# Patient Record
Sex: FEMALE | Race: WHITE | NOT HISPANIC OR LATINO | Employment: PART TIME | URBAN - METROPOLITAN AREA
[De-identification: names, ages, dates, MRNs, and addresses within clinical notes are randomized per-mention and may not be internally consistent; named-entity substitution may affect disease eponyms.]

---

## 2019-01-04 ENCOUNTER — HOSPITAL ENCOUNTER (OUTPATIENT)
Facility: OTHER | Age: 59
Discharge: HOME | End: 2019-01-05
Attending: EMERGENCY MEDICINE | Admitting: EMERGENCY MEDICINE
Payer: COMMERCIAL

## 2019-01-04 DIAGNOSIS — R60.0 LOWER EXTREMITY EDEMA: ICD-10-CM

## 2019-01-04 DIAGNOSIS — L03.116 LEFT LEG CELLULITIS: ICD-10-CM

## 2019-01-04 DIAGNOSIS — L03.116 CELLULITIS OF LEFT LOWER EXTREMITY WITHOUT FOOT: Primary | ICD-10-CM

## 2019-01-04 DIAGNOSIS — R60.0 LEG EDEMA, LEFT: ICD-10-CM

## 2019-01-04 DIAGNOSIS — E03.9 ACQUIRED HYPOTHYROIDISM: ICD-10-CM

## 2019-01-04 DIAGNOSIS — I10 ESSENTIAL HYPERTENSION: ICD-10-CM

## 2019-01-04 DIAGNOSIS — E66.01 MORBID OBESITY WITH BMI OF 45.0-49.9, ADULT: ICD-10-CM

## 2019-01-04 DIAGNOSIS — F41.8 ANXIETY WITH DEPRESSION: ICD-10-CM

## 2019-01-04 PROBLEM — F32.A DEPRESSION: Status: RESOLVED | Noted: 2019-01-04 | Resolved: 2019-01-04

## 2019-01-04 PROBLEM — F32.A DEPRESSION: Status: ACTIVE | Noted: 2019-01-04

## 2019-01-04 LAB
ALBUMIN SERPL BCP-MCNC: 3.4 G/DL
ALP SERPL-CCNC: 86 U/L
ALT SERPL W/O P-5'-P-CCNC: 15 U/L
ANION GAP SERPL CALC-SCNC: 7 MMOL/L
AST SERPL-CCNC: 12 U/L
BASOPHILS # BLD AUTO: 0.03 K/UL
BASOPHILS NFR BLD: 0.3 %
BILIRUB SERPL-MCNC: 0.3 MG/DL
BUN SERPL-MCNC: 18 MG/DL
CALCIUM SERPL-MCNC: 9.3 MG/DL
CHLORIDE SERPL-SCNC: 109 MMOL/L
CO2 SERPL-SCNC: 23 MMOL/L
CREAT SERPL-MCNC: 0.8 MG/DL
DIFFERENTIAL METHOD: ABNORMAL
EOSINOPHIL # BLD AUTO: 0.2 K/UL
EOSINOPHIL NFR BLD: 1.7 %
ERYTHROCYTE [DISTWIDTH] IN BLOOD BY AUTOMATED COUNT: 14.5 %
EST. GFR  (AFRICAN AMERICAN): >60 ML/MIN/1.73 M^2
EST. GFR  (NON AFRICAN AMERICAN): >60 ML/MIN/1.73 M^2
GLUCOSE SERPL-MCNC: 112 MG/DL
HCT VFR BLD AUTO: 38.8 %
HGB BLD-MCNC: 12.4 G/DL
LYMPHOCYTES # BLD AUTO: 1.3 K/UL
LYMPHOCYTES NFR BLD: 13.3 %
MCH RBC QN AUTO: 28.6 PG
MCHC RBC AUTO-ENTMCNC: 32 G/DL
MCV RBC AUTO: 89 FL
MONOCYTES # BLD AUTO: 0.8 K/UL
MONOCYTES NFR BLD: 8.2 %
NEUTROPHILS # BLD AUTO: 7.5 K/UL
NEUTROPHILS NFR BLD: 76.3 %
PLATELET # BLD AUTO: 305 K/UL
PMV BLD AUTO: 9.2 FL
POTASSIUM SERPL-SCNC: 4.4 MMOL/L
PROT SERPL-MCNC: 6.8 G/DL
RBC # BLD AUTO: 4.34 M/UL
SODIUM SERPL-SCNC: 139 MMOL/L
WBC # BLD AUTO: 9.81 K/UL

## 2019-01-04 PROCEDURE — 85025 COMPLETE CBC W/AUTO DIFF WBC: CPT

## 2019-01-04 PROCEDURE — 63600175 PHARM REV CODE 636 W HCPCS: Performed by: NURSE PRACTITIONER

## 2019-01-04 PROCEDURE — 96366 THER/PROPH/DIAG IV INF ADDON: CPT

## 2019-01-04 PROCEDURE — 87040 BLOOD CULTURE FOR BACTERIA: CPT

## 2019-01-04 PROCEDURE — 25000003 PHARM REV CODE 250: Performed by: NURSE PRACTITIONER

## 2019-01-04 PROCEDURE — 99285 EMERGENCY DEPT VISIT HI MDM: CPT | Mod: 25

## 2019-01-04 PROCEDURE — 63600175 PHARM REV CODE 636 W HCPCS: Performed by: EMERGENCY MEDICINE

## 2019-01-04 PROCEDURE — 99220 PR INITIAL OBSERVATION CARE,LEVL III: ICD-10-PCS | Mod: ,,, | Performed by: NURSE PRACTITIONER

## 2019-01-04 PROCEDURE — 96365 THER/PROPH/DIAG IV INF INIT: CPT

## 2019-01-04 PROCEDURE — G0378 HOSPITAL OBSERVATION PER HR: HCPCS

## 2019-01-04 PROCEDURE — 99220 PR INITIAL OBSERVATION CARE,LEVL III: CPT | Mod: ,,, | Performed by: NURSE PRACTITIONER

## 2019-01-04 PROCEDURE — 80053 COMPREHEN METABOLIC PANEL: CPT

## 2019-01-04 PROCEDURE — 94761 N-INVAS EAR/PLS OXIMETRY MLT: CPT

## 2019-01-04 PROCEDURE — 25000003 PHARM REV CODE 250: Performed by: EMERGENCY MEDICINE

## 2019-01-04 RX ORDER — CEFAZOLIN SODIUM 1 G/50ML
1 SOLUTION INTRAVENOUS
Status: DISCONTINUED | OUTPATIENT
Start: 2019-01-04 | End: 2019-01-05 | Stop reason: HOSPADM

## 2019-01-04 RX ORDER — ALLOPURINOL 300 MG/1
300 TABLET ORAL DAILY
Refills: 3 | COMMUNITY
Start: 2018-10-10

## 2019-01-04 RX ORDER — LORAZEPAM 0.5 MG/1
0.5 TABLET ORAL EVERY 6 HOURS PRN
Status: DISCONTINUED | OUTPATIENT
Start: 2019-01-04 | End: 2019-01-05 | Stop reason: HOSPADM

## 2019-01-04 RX ORDER — LORAZEPAM 0.5 MG/1
TABLET ORAL
Refills: 1 | COMMUNITY
Start: 2018-10-18

## 2019-01-04 RX ORDER — LEVOTHYROXINE SODIUM 100 UG/1
100 TABLET ORAL DAILY
COMMUNITY
Start: 2011-02-01

## 2019-01-04 RX ORDER — SERTRALINE HYDROCHLORIDE 25 MG/1
25 TABLET, FILM COATED ORAL DAILY
COMMUNITY

## 2019-01-04 RX ORDER — ACETAMINOPHEN 325 MG/1
650 TABLET ORAL EVERY 4 HOURS PRN
Status: DISCONTINUED | OUTPATIENT
Start: 2019-01-04 | End: 2019-01-05 | Stop reason: HOSPADM

## 2019-01-04 RX ORDER — LISINOPRIL 20 MG/1
20 TABLET ORAL DAILY
Status: DISCONTINUED | OUTPATIENT
Start: 2019-01-04 | End: 2019-01-05 | Stop reason: HOSPADM

## 2019-01-04 RX ORDER — LEVOTHYROXINE SODIUM 100 UG/1
100 TABLET ORAL
Status: DISCONTINUED | OUTPATIENT
Start: 2019-01-05 | End: 2019-01-05 | Stop reason: HOSPADM

## 2019-01-04 RX ORDER — CLINDAMYCIN HYDROCHLORIDE 150 MG/1
450 CAPSULE ORAL EVERY 8 HOURS
Qty: 63 CAPSULE | Refills: 0 | Status: SHIPPED | OUTPATIENT
Start: 2019-01-04 | End: 2019-01-05 | Stop reason: HOSPADM

## 2019-01-04 RX ORDER — SODIUM CHLORIDE 0.9 % (FLUSH) 0.9 %
5 SYRINGE (ML) INJECTION
Status: DISCONTINUED | OUTPATIENT
Start: 2019-01-04 | End: 2019-01-05 | Stop reason: HOSPADM

## 2019-01-04 RX ORDER — ONDANSETRON 8 MG/1
8 TABLET, ORALLY DISINTEGRATING ORAL EVERY 8 HOURS PRN
Qty: 21 TABLET | Refills: 0 | Status: SHIPPED | OUTPATIENT
Start: 2019-01-04 | End: 2019-01-05 | Stop reason: HOSPADM

## 2019-01-04 RX ORDER — LISINOPRIL 20 MG/1
20 TABLET ORAL DAILY
Refills: 3 | COMMUNITY
Start: 2018-10-10

## 2019-01-04 RX ORDER — ONDANSETRON 8 MG/1
8 TABLET, ORALLY DISINTEGRATING ORAL EVERY 8 HOURS PRN
Status: DISCONTINUED | OUTPATIENT
Start: 2019-01-04 | End: 2019-01-05 | Stop reason: HOSPADM

## 2019-01-04 RX ORDER — ONDANSETRON 8 MG/1
8 TABLET, ORALLY DISINTEGRATING ORAL
Status: COMPLETED | OUTPATIENT
Start: 2019-01-04 | End: 2019-01-04

## 2019-01-04 RX ORDER — CLINDAMYCIN HYDROCHLORIDE 150 MG/1
450 CAPSULE ORAL
Status: COMPLETED | OUTPATIENT
Start: 2019-01-04 | End: 2019-01-04

## 2019-01-04 RX ORDER — ALLOPURINOL 300 MG/1
300 TABLET ORAL DAILY
Status: DISCONTINUED | OUTPATIENT
Start: 2019-01-04 | End: 2019-01-05 | Stop reason: HOSPADM

## 2019-01-04 RX ORDER — SERTRALINE HYDROCHLORIDE 25 MG/1
25 TABLET, FILM COATED ORAL DAILY
Status: DISCONTINUED | OUTPATIENT
Start: 2019-01-04 | End: 2019-01-05 | Stop reason: HOSPADM

## 2019-01-04 RX ADMIN — CEFAZOLIN SODIUM 1 G: 1 SOLUTION INTRAVENOUS at 04:01

## 2019-01-04 RX ADMIN — DOXYCYCLINE 100 MG: 100 INJECTION, POWDER, LYOPHILIZED, FOR SOLUTION INTRAVENOUS at 02:01

## 2019-01-04 RX ADMIN — CLINDAMYCIN HYDROCHLORIDE 450 MG: 150 CAPSULE ORAL at 06:01

## 2019-01-04 RX ADMIN — PROMETHAZINE HYDROCHLORIDE 12.5 MG: 25 INJECTION INTRAMUSCULAR; INTRAVENOUS at 11:01

## 2019-01-04 RX ADMIN — PROMETHAZINE HYDROCHLORIDE 12.5 MG: 25 INJECTION INTRAMUSCULAR; INTRAVENOUS at 01:01

## 2019-01-04 RX ADMIN — ONDANSETRON 8 MG: 8 TABLET, ORALLY DISINTEGRATING ORAL at 07:01

## 2019-01-04 RX ADMIN — PROMETHAZINE HYDROCHLORIDE 12.5 MG: 25 INJECTION INTRAMUSCULAR; INTRAVENOUS at 07:01

## 2019-01-04 RX ADMIN — ONDANSETRON 8 MG: 8 TABLET, ORALLY DISINTEGRATING ORAL at 06:01

## 2019-01-04 NOTE — ASSESSMENT & PLAN NOTE
- doppler ultrasound left lower extremity negative for acute deep vein thrombosis   - patient afebrile and without leukocytosis; blood cultures pending  - start doxycycline 100 mg IVPB twice daily now  - offer phenergan intravenously every six hours as needed for  Nausea after medication administration

## 2019-01-04 NOTE — H&P
"[facility] Medicine  History & Physical    Patient Name: Yamileth Deleon  MRN: [id number]  Admission Date: 1/4/2019  Attending Physician: Karen Sagastume MD   Primary Care Provider: Provider [employer]         Patient information was obtained from patient, past medical records and ER records.     Subjective:     Principal Problem:Left leg cellulitis    Chief Complaint:   Chief Complaint   Patient presents with    Cellulitis     to the L lower leg, painful to touch and hot. states she was on a plane for 12 hrs yesterday        HPI: Jennifer Deleon is a 58 year old female with a medical history of hypertension, thyroid disease, and anxiety presented to the [facility] Department with complaint of left lower leg swelling and pain that was first noted yesterday evening and reports began several hours after eating  "gumbo and raw oysters."  The patient states she is visiting town from [place] and arrived last night after travelling two hours in a car and two plane rides.   She reported initial symptoms of redness and swelling to left ankle.  Overnight she reports increased swelling and the appearance of non-pruritic  "red rash" to left ankle and shin.    She denies fever/chills, chest pain, shortness of breath, fever, chills or malaise.  Doppler ultrasound completed in the ED was negative for acute deep vein thrombosis and labs were unremarkable and she was afebrile.  She received one dose of oral clindamycin 450 mg in the ED. She was admitted under Observation for further evaluation of left lower extremity cellulitis and administration of intravenous antibiotics.           Past Medical History:   Diagnosis Date    Depression     Gout     Hypertension     Thyroid disease        Past Surgical History:   Procedure Laterality Date    BACK SURGERY      BREAST SURGERY      HERNIA REPAIR         Review of patient's allergies indicates:   Allergen Reactions    " Doxycycline Nausea And Vomiting     Reports intolerance to oral antibiotics    Indomethacin Nausea And Vomiting       No current facility-administered medications on file prior to encounter.      Current Outpatient Medications on File Prior to Encounter   Medication Sig    allopurinol (ZYLOPRIM) 300 MG tablet Take 300 mg by mouth once daily.    levothyroxine (LEVOXYL) 100 MCG tablet Take by mouth.    lisinopril (PRINIVIL,ZESTRIL) 20 MG tablet Take 20 mg by mouth once daily.    LORazepam (ATIVAN) 0.5 MG tablet TAKE 1 TABLET BY MOUTH EVERY 6 HOURS TO EVERY 8 HOURS AS NEEDED    sertraline (ZOLOFT) 50 MG tablet Take 75 mg by mouth.     Family History     None        Tobacco Use    Smoking status: Never Smoker    Smokeless tobacco: Never Used   Substance and Sexual Activity    Alcohol use: Yes    Drug use: No    Sexual activity: Not on file     Review of Systems   Constitutional: Negative for chills and fever.   HENT: Negative for congestion and trouble swallowing.    Eyes: Negative for visual disturbance.   Respiratory: Negative for chest tightness and shortness of breath.    Cardiovascular: Negative for chest pain and palpitations.   Gastrointestinal: Negative for abdominal pain, diarrhea, nausea and vomiting.   Genitourinary: Negative for dysuria, frequency and hematuria.   Musculoskeletal: Positive for gait problem (left ankle pain).   Skin: Positive for color change (left shin and ankle) and rash (left shin and ankle).   Neurological: Negative for seizures, weakness and headaches.   Hematological: Does not bruise/bleed easily.   Psychiatric/Behavioral: Negative.      Objective:     Vital Signs (Most Recent):  Temp: 98.6 °F (37 °C) (01/04/19 1116)  Pulse: 94 (01/04/19 1116)  Resp: 17 (01/04/19 1116)  BP: 139/79 (01/04/19 1116)  SpO2: 99 % (01/04/19 1116) Vital Signs (24h Range):  Temp:  [97.5 °F (36.4 °C)-98.6 °F (37 °C)] 98.6 °F (37 °C)  Pulse:  [66-94] 94  Resp:  [16-20] 17  SpO2:  [94 %-99 %] 99 %  BP:  (120-176)/(60-86) 139/79     Weight: 134.4 kg (296 lb 4.8 oz)  Body mass index is 46.41 kg/m².    Physical Exam   Constitutional: She is oriented to person, place, and time. She appears well-developed and well-nourished. No distress.   HENT:   Head: Normocephalic and atraumatic.   Mouth/Throat: Oropharynx is clear and moist.   Eyes: Conjunctivae, EOM and lids are normal. Pupils are equal, round, and reactive to light.   Neck: Normal range of motion. Neck supple.   Cardiovascular: Normal rate, regular rhythm, normal heart sounds and intact distal pulses.   Pulmonary/Chest: Effort normal and breath sounds normal.   Abdominal: Soft. Normal appearance and bowel sounds are normal. There is no tenderness.   Musculoskeletal: She exhibits edema (left shin and ankle, localized) and tenderness.   Lymphadenopathy:     She has no cervical adenopathy.   Neurological: She is alert and oriented to person, place, and time. She has normal strength.   Skin: Skin is warm, dry and intact. Rash noted. Rash is macular. Rash is not pustular, not vesicular and not urticarial. There is erythema.        Psychiatric: She has a normal mood and affect. Her behavior is normal. Cognition and memory are normal.   Nursing note and vitals reviewed.        CRANIAL NERVES     CN III, IV, VI   Pupils are equal, round, and reactive to light.  Extraocular motions are normal.        Significant Labs:   Blood Culture: No results for input(s): LABBLOO in the last 48 hours.  CBC:   Recent Labs   Lab 01/04/19  0631   WBC 9.81   HGB 12.4   HCT 38.8        CMP:   Recent Labs   Lab 01/04/19  0631      K 4.4      CO2 23   *   BUN 18   CREATININE 0.8   CALCIUM 9.3   PROT 6.8   ALBUMIN 3.4*   BILITOT 0.3   ALKPHOS 86   AST 12   ALT 15   ANIONGAP 7*   EGFRNONAA >60        All pertinent labs within the past 24 hours have been reviewed.    Significant Imaging: I have reviewed all pertinent imaging results/findings within the past 24  hours.    Assessment/Plan:     * Left leg cellulitis    - doppler ultrasound left lower extremity negative for acute deep vein thrombosis   - patient afebrile and without leukocytosis; blood cultures pending  - start doxycycline 100 mg IVPB twice daily now  - offer phenergan intravenously every six hours as needed for  Nausea after medication administration     Essential hypertension    - stable and continue home dose of lisinopril 20 mg daily       Acquired hypothyroidism    - continue home dose levothyroxine 100 mcg daily       Anxiety with depression    - stable and continue home dose sertraline 25 mg daily       Morbid obesity with BMI of 45.0-49.9, adult    -  on dietary choices and increase physical activity         VTE Risk Mitigation (From admission, onward)        Ordered     IP VTE HIGH RISK PATIENT  Once      01/04/19 0901             Maryse Colbert NP  Department of Hospital Medicine   [facility]

## 2019-01-04 NOTE — PLAN OF CARE
Discharge Planning:  Patient admitted on 1-4-19  LOS- day 0     Chart reviewed, Care plan discussed  Discussed care plan with treatment team,  attending Dr Sagastume/Maryse REYNOLDS  Consults following are: case mgt.     PCP updated in Epic: Dorinda Lara in [place]  Pharmacy, updated in [facility]: [facility]    DME at home: n/a    Current dispo: home soon. (From [facility])  Case management  to follow

## 2019-01-04 NOTE — ED PROVIDER NOTES
Encounter Date: 1/4/2019       History     Chief Complaint   Patient presents with    Cellulitis     to the L lower leg, painful to touch and hot. states she was on a plane for 12 hrs yesterday     58-year-old female with history of hypertension and thyroid disease presents complaining of left lower leg pain which began overnight.  Patient reports traveling by car and plane from [place] to [place] and arriving last night.  She reports shortly after having dinner (gumbo and raw oyster) last night she noticed some redness and warmth to her leg which subsequently became painful overnight.  She reports never having gumbo before but has had raw oysters without any complication.  She denies any associated chest pain, shortness of breath, fever, chills or malaise.            Review of patient's allergies indicates:   Allergen Reactions    Doxycycline Nausea And Vomiting    Indomethacin Nausea And Vomiting     Past Medical History:   Diagnosis Date    Depression     Gout     Hypertension     Thyroid disease      Past Surgical History:   Procedure Laterality Date    BACK SURGERY      BREAST SURGERY      HERNIA REPAIR       History reviewed. No pertinent family history.  Social History     Tobacco Use    Smoking status: Never Smoker    Smokeless tobacco: Never Used   Substance Use Topics    Alcohol use: Yes    Drug use: No     Review of Systems   Constitutional: Negative for chills, fatigue and fever.   HENT: Negative for facial swelling and sore throat.    Respiratory: Negative for cough, chest tightness, wheezing and stridor.    Cardiovascular: Negative for chest pain and palpitations.   Gastrointestinal: Negative for abdominal pain, diarrhea, nausea and vomiting.   Musculoskeletal: Negative for arthralgias, back pain and myalgias.   Skin: Positive for color change.   Neurological: Negative for dizziness, light-headedness and headaches.       Physical Exam     Initial Vitals [01/04/19 0508]   BP Pulse Resp  Temp SpO2   (!) 144/86 91 20 98.2 °F (36.8 °C) (!) 94 %      MAP       --         Physical Exam    Constitutional: She appears well-developed and well-nourished. She is not diaphoretic. No distress.   HENT:   Head: Normocephalic and atraumatic.   Right Ear: External ear normal.   Left Ear: External ear normal.   Nose: Nose normal.   Eyes: Conjunctivae and EOM are normal. Right eye exhibits no discharge. Left eye exhibits no discharge. No scleral icterus.   Neck: Normal range of motion. Neck supple. No tracheal deviation present. No JVD present.   Cardiovascular: Normal rate, regular rhythm, normal heart sounds and intact distal pulses.   2+ DP pulse   Pulmonary/Chest: Breath sounds normal. No stridor. No respiratory distress. She has no wheezes. She has no rhonchi. She has no rales.   Musculoskeletal: Normal range of motion.   No calf tenderness, no palpable masses records, compartments and lower leg soft   Neurological: She is alert and oriented to person, place, and time. She has normal strength. No sensory deficit. GCS score is 15. GCS eye subscore is 4. GCS verbal subscore is 5. GCS motor subscore is 6.   Skin: Skin is warm. Capillary refill takes less than 2 seconds. There is erythema.   Non circumferential Erythema and warmth to the anterior left lower leg with no involvement of the foot   Psychiatric: She has a normal mood and affect. Her behavior is normal. Judgment and thought content normal.         ED Course   Procedures  Labs Reviewed   CBC W/ AUTO DIFFERENTIAL - Abnormal; Notable for the following components:       Result Value    Gran% 76.3 (*)     Lymph% 13.3 (*)     All other components within normal limits   CULTURE, BLOOD   CULTURE, BLOOD   COMPREHENSIVE METABOLIC PANEL          Imaging Results    None             Additional MDM:   Comments: 58-year-old female presents with an erythematous, warm, painful left lower leg after recent travel.  Her concern upon arrival was a possible DVT, however, her  presentation is most consistent with a cellulitis.  She currently exam has no systemic symptoms and is afebrile, hemodynamically stable and well-appearing.  I have discussed with her the plan of giving her antibiotics in the emergency department, outlining the area of erythema, and obtaining basic labs as well as discharging her with a prescription for antibiotics.  The patient is amenable to this plan but is requesting to not receive doxycycline since she has not tolerated it well previously.  He is also requesting a prescription for antiemetics as she typically has difficulty tolerating antibiotics.    She will receive a dose of clindamycin in the emergency department along with Zofran and has been stressed to her to seek immediate re-evaluation if she experiences any fever, worsening redness/warmth the on the area outlined this morning, or if she develops any other symptoms.    6:55 AM  Upon further consideration, given rapid progression of area of cellulitis, the patient will be admitted for observation.  She has agreed to receive doxycycline which will be preceded by Phenergan..                    Clinical Impression:     1. Left leg cellulitis                                   Xochitl Gaston MD  01/04/19 0693

## 2019-01-04 NOTE — PHARMACY MED REC
"Admission Medication Reconciliation - Pharmacy Consult Note    The home medication history was taken by Juana Ko CPhT.  Based on information gathered and subsequent review by the clinical pharmacist, the items below may need attention.    You may go to "Admission" then "Reconcile Home Medications" tabs to review and/or act upon these items.    No issues noted with the medication reconciliation.    Medications Prior to Admission   Medication Sig Dispense Refill Last Dose    allopurinol (ZYLOPRIM) 300 MG tablet Take 300 mg by mouth once daily.  3 1/3/2019 at Unknown time    levothyroxine (LEVOXYL) 100 MCG tablet Take 100 mcg by mouth once daily.    1/3/2019 at Unknown time    lisinopril (PRINIVIL,ZESTRIL) 20 MG tablet Take 20 mg by mouth once daily.  3 1/3/2019 at Unknown time    LORazepam (ATIVAN) 0.5 MG tablet TAKE 1 TABLET BY MOUTH EVERY 6 HOURS TO EVERY 8 HOURS AS NEEDED  1 1/3/2019 at Unknown time    sertraline (ZOLOFT) 25 MG tablet Take 25 mg by mouth once daily.    1/3/2019 at Unknown time       Please address this information as you see fit.  Feel free to contact us if you have any questions or require assistance.    Laurent Mckeon, Pharm.D., BCPS  [phone]                .  .          "

## 2019-01-04 NOTE — SUBJECTIVE & OBJECTIVE
Past Medical History:   Diagnosis Date    Depression     Gout     Hypertension     Thyroid disease        Past Surgical History:   Procedure Laterality Date    BACK SURGERY      BREAST SURGERY      HERNIA REPAIR         Review of patient's allergies indicates:   Allergen Reactions    Doxycycline Nausea And Vomiting     Reports intolerance to oral antibiotics    Indomethacin Nausea And Vomiting       No current facility-administered medications on file prior to encounter.      Current Outpatient Medications on File Prior to Encounter   Medication Sig    allopurinol (ZYLOPRIM) 300 MG tablet Take 300 mg by mouth once daily.    levothyroxine (LEVOXYL) 100 MCG tablet Take by mouth.    lisinopril (PRINIVIL,ZESTRIL) 20 MG tablet Take 20 mg by mouth once daily.    LORazepam (ATIVAN) 0.5 MG tablet TAKE 1 TABLET BY MOUTH EVERY 6 HOURS TO EVERY 8 HOURS AS NEEDED    sertraline (ZOLOFT) 50 MG tablet Take 75 mg by mouth.     Family History     None        Tobacco Use    Smoking status: Never Smoker    Smokeless tobacco: Never Used   Substance and Sexual Activity    Alcohol use: Yes    Drug use: No    Sexual activity: Not on file     Review of Systems   Constitutional: Negative for chills and fever.   HENT: Negative for congestion and trouble swallowing.    Eyes: Negative for visual disturbance.   Respiratory: Negative for chest tightness and shortness of breath.    Cardiovascular: Negative for chest pain and palpitations.   Gastrointestinal: Negative for abdominal pain, diarrhea, nausea and vomiting.   Genitourinary: Negative for dysuria, frequency and hematuria.   Musculoskeletal: Positive for gait problem (left ankle pain).   Skin: Positive for color change (left shin and ankle) and rash (left shin and ankle).   Neurological: Negative for seizures, weakness and headaches.   Hematological: Does not bruise/bleed easily.   Psychiatric/Behavioral: Negative.      Objective:     Vital Signs (Most Recent):  Temp:  98.6 °F (37 °C) (01/04/19 1116)  Pulse: 94 (01/04/19 1116)  Resp: 17 (01/04/19 1116)  BP: 139/79 (01/04/19 1116)  SpO2: 99 % (01/04/19 1116) Vital Signs (24h Range):  Temp:  [97.5 °F (36.4 °C)-98.6 °F (37 °C)] 98.6 °F (37 °C)  Pulse:  [66-94] 94  Resp:  [16-20] 17  SpO2:  [94 %-99 %] 99 %  BP: (120-176)/(60-86) 139/79     Weight: 134.4 kg (296 lb 4.8 oz)  Body mass index is 46.41 kg/m².    Physical Exam   Constitutional: She is oriented to person, place, and time. She appears well-developed and well-nourished. No distress.   HENT:   Head: Normocephalic and atraumatic.   Mouth/Throat: Oropharynx is clear and moist.   Eyes: Conjunctivae, EOM and lids are normal. Pupils are equal, round, and reactive to light.   Neck: Normal range of motion. Neck supple.   Cardiovascular: Normal rate, regular rhythm, normal heart sounds and intact distal pulses.   Pulmonary/Chest: Effort normal and breath sounds normal.   Abdominal: Soft. Normal appearance and bowel sounds are normal. There is no tenderness.   Musculoskeletal: She exhibits edema (left shin and ankle, localized) and tenderness.   Lymphadenopathy:     She has no cervical adenopathy.   Neurological: She is alert and oriented to person, place, and time. She has normal strength.   Skin: Skin is warm, dry and intact. Rash noted. Rash is macular. Rash is not pustular, not vesicular and not urticarial. There is erythema.        Psychiatric: She has a normal mood and affect. Her behavior is normal. Cognition and memory are normal.   Nursing note and vitals reviewed.        CRANIAL NERVES     CN III, IV, VI   Pupils are equal, round, and reactive to light.  Extraocular motions are normal.        Significant Labs:   Blood Culture: No results for input(s): LABBLOO in the last 48 hours.  CBC:   Recent Labs   Lab 01/04/19  0631   WBC 9.81   HGB 12.4   HCT 38.8        CMP:   Recent Labs   Lab 01/04/19  0631      K 4.4      CO2 23   *   BUN 18   CREATININE  0.8   CALCIUM 9.3   PROT 6.8   ALBUMIN 3.4*   BILITOT 0.3   ALKPHOS 86   AST 12   ALT 15   ANIONGAP 7*   EGFRNONAA >60        All pertinent labs within the past 24 hours have been reviewed.    Significant Imaging: I have reviewed all pertinent imaging results/findings within the past 24 hours.

## 2019-01-04 NOTE — HPI
"Jennifer Deleon is a 58 year old female with a medical history of hypertension, thyroid disease, and anxiety presented to the [facility] with complaint of left lower leg swelling and pain that was first noted yesterday evening and reports began several hours after eating  "gumbo and raw oysters."  The patient states she is visiting [facility] from [place] and arrived last night after travelling two hours in a car and two plane rides.   She reported initial symptoms of redness and swelling to left ankle.  Overnight she reports increased swelling and the appearance of non-pruritic  "red rash" to left ankle and shin.    She denies fever/chills, chest pain, shortness of breath, fever, chills or malaise.  Doppler ultrasound completed in the ED was negative for acute deep vein thrombosis and labs were unremarkable and she was afebrile.  She received one dose of oral clindamycin 450 mg in the ED. She was admitted under Observation for further evaluation of left lower extremity cellulitis and administration of intravenous antibiotics.         "

## 2019-01-04 NOTE — PLAN OF CARE
01/04/19 1252   Discharge Assessment   Assessment Type Discharge Planning Assessment   Confirmed/corrected address and phone number on facesheet? Yes  ([facility])   Assessment information obtained from? Patient;Caregiver;Medical Record   Communicated expected length of stay with patient/caregiver yes   Prior to hospitilization cognitive status: Alert/Oriented   Prior to hospitalization functional status: Independent   Current cognitive status: Alert/Oriented   Current Functional Status: Assistive Equipment   Lives With parent(s)   Able to Return to Prior Arrangements yes   Is patient able to care for self after discharge? Yes   Patient currently being followed by outpatient case management? No   Patient currently receives any other outside agency services? No   Equipment Currently Used at Home none   Do you have any problems affording any of your prescribed medications? No   Is the patient taking medications as prescribed? yes   Does the patient have transportation home? Yes   Transportation Anticipated car, drives self   Discharge Plan A Home   Patient/Family in Agreement with Plan yes

## 2019-01-05 VITALS
TEMPERATURE: 98 F | BODY MASS INDEX: 45.99 KG/M2 | SYSTOLIC BLOOD PRESSURE: 132 MMHG | WEIGHT: 293 LBS | HEART RATE: 62 BPM | RESPIRATION RATE: 18 BRPM | DIASTOLIC BLOOD PRESSURE: 63 MMHG | OXYGEN SATURATION: 97 % | HEIGHT: 67 IN

## 2019-01-05 PROCEDURE — 25000003 PHARM REV CODE 250: Performed by: NURSE PRACTITIONER

## 2019-01-05 PROCEDURE — G0378 HOSPITAL OBSERVATION PER HR: HCPCS

## 2019-01-05 PROCEDURE — 63600175 PHARM REV CODE 636 W HCPCS: Performed by: NURSE PRACTITIONER

## 2019-01-05 PROCEDURE — 99217 PR OBSERVATION CARE DISCHARGE: CPT | Mod: ,,, | Performed by: NURSE PRACTITIONER

## 2019-01-05 PROCEDURE — 99217 PR OBSERVATION CARE DISCHARGE: ICD-10-PCS | Mod: ,,, | Performed by: NURSE PRACTITIONER

## 2019-01-05 RX ORDER — SODIUM CHLORIDE 0.9 % (FLUSH) 0.9 %
5 SYRINGE (ML) INJECTION
Status: DISCONTINUED | OUTPATIENT
Start: 2019-01-05 | End: 2019-01-05 | Stop reason: HOSPADM

## 2019-01-05 RX ORDER — PROMETHAZINE HYDROCHLORIDE 12.5 MG/1
12.5 TABLET ORAL EVERY 6 HOURS PRN
Qty: 25 TABLET | Refills: 0 | Status: SHIPPED | OUTPATIENT
Start: 2019-01-05 | End: 2019-01-12

## 2019-01-05 RX ORDER — CEPHALEXIN 500 MG/1
500 CAPSULE ORAL EVERY 6 HOURS
Qty: 28 CAPSULE | Refills: 0 | Status: SHIPPED | OUTPATIENT
Start: 2019-01-05 | End: 2019-01-12

## 2019-01-05 RX ADMIN — LISINOPRIL 20 MG: 20 TABLET ORAL at 08:01

## 2019-01-05 RX ADMIN — CEFAZOLIN SODIUM 1 G: 1 SOLUTION INTRAVENOUS at 01:01

## 2019-01-05 RX ADMIN — PROMETHAZINE HYDROCHLORIDE 12.5 MG: 25 INJECTION INTRAMUSCULAR; INTRAVENOUS at 10:01

## 2019-01-05 RX ADMIN — SERTRALINE HYDROCHLORIDE 25 MG: 25 TABLET ORAL at 08:01

## 2019-01-05 RX ADMIN — LEVOTHYROXINE SODIUM 100 MCG: 100 TABLET ORAL at 06:01

## 2019-01-05 RX ADMIN — ALLOPURINOL 300 MG: 300 TABLET ORAL at 08:01

## 2019-01-05 RX ADMIN — CEFAZOLIN SODIUM 1 G: 1 SOLUTION INTRAVENOUS at 08:01

## 2019-01-05 NOTE — PLAN OF CARE
Discharge planner notified [facility] medical on-call, Sb to obtain authorization to pull cane from [employer]. Per Sb , patient's insurance benefits would need prior verification for auth.  Notified Yesi (CM Director) for cost transfer authorization in the event patient's payor does not cover item. Per Yesi okay to pull from [employer].    1055-Straight cane delivered to patient's bedside; patient provided equipment instructions and signed delivery ticket

## 2019-01-05 NOTE — DISCHARGE INSTRUCTIONS
It is very important he seek immediate re-evaluation if he develops any fever, worsening pain, further redness or swelling beyond the area outlined this morning.  It is also very important that he take the antibiotics as prescribed.

## 2019-01-05 NOTE — PLAN OF CARE
Problem: Adult Inpatient Plan of Care  Goal: Plan of Care Review  Outcome: Ongoing (interventions implemented as appropriate)  Pt in room resting and reading. Pt complaints of pain to left lower leg but refuses medication because pain is only when walking to restroom. Nausea intermittent. Pt informed to keep extremity elevated, site pink/red and warm, tender to touch. VSS. NAD. Pt updated on POC and verbalized understanding. Bed l;ocked and in lowest position. Call bell in reach. Condition stable, will continue to monitor.

## 2019-01-05 NOTE — PLAN OF CARE
Problem: Adult Inpatient Plan of Care  Goal: Plan of Care Review  Outcome: Ongoing (interventions implemented as appropriate)  Pt's V/S were monitored throughout the shift. The pt ambulated to the restroom independently. The pt only c/o pain with ambulation . The pt remained free from falls and trauma. The pt c/o intermittent nausea and received PO and IV medication with relief. The pt's left leg remained elevated. The pt's LLE is warm and red. The pt's bed is in the lowest position with the bed wheels locked. Call light within reach. NAD noted. Will continue to monitor.

## 2019-01-06 NOTE — DISCHARGE SUMMARY
"[facility] Hospital Medicine  Discharge Summary      Patient Name: Yamileth Dleeon  MRN: [id number]  Admission Date: 1/4/2019  Hospital Length of Stay: 0 days  Discharge Date and Time: 1/5/2019  2:55 PM  Attending Physician: Karen Sagastume MD   Discharging Provider: Maryse Colbert NP  Primary Care Provider: Johana Notinsystem      HPI:   Jennifer Deleon is a 58 year old female with a medical history of hypertension, thyroid disease, and anxiety presented to the [facility] with complaint of left lower leg swelling and pain that was first noted yesterday evening and reports began several hours after eating  "gumbo and raw oysters."  The patient states she is visiting town from [place] and arrived last night after travelling two hours in a car and two plane rides.   She reported initial symptoms of redness and swelling to left ankle.  Overnight she reports increased swelling and the appearance of non-pruritic  "red rash" to left ankle and shin.    She denies fever/chills, chest pain, shortness of breath, fever, chills or malaise.  Doppler ultrasound completed in the ED was negative for acute deep vein thrombosis and labs were unremarkable and she was afebrile.  She received one dose of oral clindamycin 450 mg in the ED. She was admitted under Observation for further evaluation of left lower extremity cellulitis and administration of intravenous antibiotics.         Hospital Course:   Yamileth Deleon was admitted under Observation for management of left lower extremity cellulitis.   The patient received one dose of  doxycyline 100 mg IVPB and was transitioned to cefazolin 1 gram every 8 hours for increased MSSA coverage.   Given the patient reported history of nausea and vomiting with antibiotics, she received phenergan without report of symptoms.   The patient reported decrease pain; on physical exam both edema and erythema was significantly resolved.  Blood " cultures collected in the ED were preliminarily negative.  She remained afebrile and without leukocytosis.  She was discharged with oral cephalexin 500 mg every six hours for a total of seven days.  I recommended the patient elevate the left lower extremity while not walking and to return to the ED with fever/chills or worsening swelling.  She plans to return to [place] on Monday and I recommended she follow up with her Primary Care Physician within one week for follow up.          Final Active Diagnoses:    Diagnosis Date Noted POA    PRINCIPAL PROBLEM:  Cellulitis of left lower extremity without foot [L03.116] 01/04/2019 Yes    Essential hypertension [I10] 01/04/2019 Yes    Acquired hypothyroidism [E03.9] 01/04/2019 Yes    Anxiety with depression [F41.8] 01/04/2019 Yes    Morbid obesity with BMI of 45.0-49.9, adult [E66.01, Z68.42] 01/04/2019 Not Applicable      Problems Resolved During this Admission:       Discharged Condition: good    Disposition: Home or Self Care    Follow Up:  Follow-up Information     Go to [facility].    Specialty:  Emergency Medicine  Why:  If symptoms worsen, please reports to [facility] for further evaluation.  Contact information:  [address]  [facility] [address] [phone]           Cortney Lara. Schedule an appointment as soon as possible for a visit in 1 week.    Why:  follow up after hospital discharge for further evaluation   Contact information:  [facility]               Patient Instructions:      Diet Adult Regular     Notify your health care provider if you experience any of the following:  temperature >100.4     Notify your health care provider if you experience any of the following:  severe uncontrolled pain     Notify your health care provider if you experience any of the following:  redness, tenderness, or signs of infection (pain, swelling, redness, odor or  green/yellow discharge around incision site)     Activity as tolerated       Significant Diagnostic Studies: Labs:     BMP  Lab Results   Component Value Date     01/04/2019    K 4.4 01/04/2019     01/04/2019    CO2 23 01/04/2019    BUN 18 01/04/2019    CREATININE 0.8 01/04/2019    CALCIUM 9.3 01/04/2019    ANIONGAP 7 (L) 01/04/2019    ESTGFRAFRICA >60 01/04/2019    EGFRNONAA >60 01/04/2019     Lab Results   Component Value Date    WBC 9.81 01/04/2019    HGB 12.4 01/04/2019    HCT 38.8 01/04/2019    MCV 89 01/04/2019     01/04/2019     and All labs within the past 24 hours have been reviewed       Medications:  Reconciled Home Medications:      Medication List      START taking these medications    cephALEXin 500 MG capsule  Commonly known as:  KEFLEX  Take 1 capsule (500 mg total) by mouth every 6 (six) hours. for 7 days     promethazine 12.5 MG Tab  Commonly known as:  PHENERGAN  Take 1 tablet (12.5 mg total) by mouth every 6 (six) hours as needed (Nausea).        CONTINUE taking these medications    allopurinol 300 MG tablet  Commonly known as:  ZYLOPRIM  Take 300 mg by mouth once daily.     LEVOXYL 100 MCG tablet  Generic drug:  levothyroxine  Take 100 mcg by mouth once daily.     lisinopril 20 MG tablet  Commonly known as:  PRINIVIL,ZESTRIL  Take 20 mg by mouth once daily.     LORazepam 0.5 MG tablet  Commonly known as:  ATIVAN  TAKE 1 TABLET BY MOUTH EVERY 6 HOURS TO EVERY 8 HOURS AS NEEDED     sertraline 25 MG tablet  Commonly known as:  ZOLOFT  Take 25 mg by mouth once daily.              Time spent on the discharge of patient: > 30 minutes  Patient was seen and examined on the date of discharge and determined to be suitable for discharge.         Maryse Colbert NP  Department of Hospital Medicine  [facility]

## 2019-01-06 NOTE — HOSPITAL COURSE
Yamileth Deleon was admitted under Observation for management of left lower extremity cellulitis.   The patient received one dose of  doxycyline 100 mg IVPB and was transitioned to cefazolin 1 gram every 8 hours for increased MSSA coverage.   Given the patient reported history of nausea and vomiting with antibiotics, she received phenergan without report of symptoms.   The patient reported decrease pain; on physical exam both edema and erythema was significantly resolved.  Blood cultures collected in the ED were preliminarily negative.  She remained afebrile and without leukocytosis.  She was discharged with oral cephalexin 500 mg every six hours for a total of seven days.  I recommended the patient elevate the left lower extremity while not walking and to return to the ED with fever/chills or worsening swelling.  She plans to return to [place] on Monday and I recommended she follow up with her Primary Care Physician within one week for follow up.

## 2019-01-09 LAB
BACTERIA BLD CULT: NORMAL
BACTERIA BLD CULT: NORMAL